# Patient Record
Sex: MALE | Race: WHITE | NOT HISPANIC OR LATINO | Employment: UNEMPLOYED | URBAN - METROPOLITAN AREA
[De-identification: names, ages, dates, MRNs, and addresses within clinical notes are randomized per-mention and may not be internally consistent; named-entity substitution may affect disease eponyms.]

---

## 2017-02-28 ENCOUNTER — HOSPITAL ENCOUNTER (OUTPATIENT)
Dept: BEHAVIORAL HEALTH | Facility: CLINIC | Age: 22
End: 2017-02-28
Attending: SOCIAL WORKER
Payer: COMMERCIAL

## 2017-02-28 ENCOUNTER — BEH TREATMENT PLAN (OUTPATIENT)
Dept: BEHAVIORAL HEALTH | Facility: CLINIC | Age: 22
End: 2017-02-28

## 2017-02-28 ENCOUNTER — HOSPITAL ENCOUNTER (OUTPATIENT)
Dept: BEHAVIORAL HEALTH | Facility: CLINIC | Age: 22
Discharge: HOME | End: 2017-02-28
Attending: SOCIAL WORKER | Admitting: SOCIAL WORKER
Payer: COMMERCIAL

## 2017-02-28 PROCEDURE — 90791 PSYCH DIAGNOSTIC EVALUATION: CPT

## 2017-02-28 RX ORDER — DIVALPROEX SODIUM 500 MG/1
1500 TABLET, EXTENDED RELEASE ORAL
COMMUNITY
Start: 2016-12-06 | End: 2022-09-30

## 2017-02-28 RX ORDER — ARIPIPRAZOLE 5 MG/1
5 TABLET ORAL
COMMUNITY
Start: 2016-12-06 | End: 2022-02-10

## 2017-02-28 RX ORDER — OMEGA-3/DHA/EPA/FISH OIL 60 MG-90MG
1200 CAPSULE ORAL
COMMUNITY
Start: 2014-07-18

## 2017-02-28 ASSESSMENT — ANXIETY QUESTIONNAIRES
GAD7 TOTAL SCORE: 4
4. TROUBLE RELAXING: NOT AT ALL
IF YOU CHECKED OFF ANY PROBLEMS ON THIS QUESTIONNAIRE, HOW DIFFICULT HAVE THESE PROBLEMS MADE IT FOR YOU TO DO YOUR WORK, TAKE CARE OF THINGS AT HOME, OR GET ALONG WITH OTHER PEOPLE: NOT DIFFICULT AT ALL
7. FEELING AFRAID AS IF SOMETHING AWFUL MIGHT HAPPEN: NOT AT ALL
5. BEING SO RESTLESS THAT IT IS HARD TO SIT STILL: NOT AT ALL
1. FEELING NERVOUS, ANXIOUS, OR ON EDGE: SEVERAL DAYS
6. BECOMING EASILY ANNOYED OR IRRITABLE: NOT AT ALL
2. NOT BEING ABLE TO STOP OR CONTROL WORRYING: SEVERAL DAYS
3. WORRYING TOO MUCH ABOUT DIFFERENT THINGS: MORE THAN HALF THE DAYS

## 2017-02-28 ASSESSMENT — PAIN SCALES - GENERAL: PAINLEVEL: MILD PAIN (2)

## 2017-02-28 NOTE — PROGRESS NOTES
Gambling Evaluation   Background Information     Date of Assessment:  2/28/2017     :  HARPREET Love     Referral Source:  Mother   Patient Name:   Farrukh Flynn   YOB: 1995 Age:  21 year old Gender:  male   [facility]  [address] [address]     Home Phone #:     Cell Phone #:  [phone]     Relationship Status  Single, in no serious relationship Ethnicity  White   Client's Primary Language:  English   E-mail address  [email or web address]   Do you give permission to give your cell # to the group?  Yes   Emergency :  Scarlett Jesus   Emergency Contact Phone #:  [phone]     Do you have learning disabilities or require special accommodations?    No     What prompted you to come for a gambling assessment today?     Mother has been calling concerned about patient's gambling and drug use.  She gave him an ultimatim to attend inpatient or outpatient.  He chose to come do this program and in exchange she would be willing to help him with his bills.     Have you been diagnosed with a gambling problem?    No     Have you been diagnosed with alcohol or drug related problems?    No         DIMENSION I - Acute Intoxication /Withdrawal Potential     Gambling History    Stage Age Games Played How Often Average Amt Bet Big Wins/Losses [place]     Early   6 - 16    [employer]  Sports betting   1xweek  1xweek   $20  $5   N/A   Social with friends.      Middle     [age]   Poker  Sports betting  Pull tabs   Monthly  Not sure  2xmo   $50    $40   $200   Borrow money.     Late     [age]   [employer]  Sports betting  On-line   5xweek  1xweek  2xweek   $600/time  $50  $150   $1500, loss in one night   Loss of degree, dropped out of college.  Lost a lot of money.  Overdrafts.  Risk of job loss.  Lying. Sold sunglasses to petersen. Sold video games.     Substance Use History             X = Primary Drug Used   Age of First Use Most Recent Pattern of  Use and Duration   Need enough information to show pattern (both frequency and amounts) and to show tolerance for each chemical that has a diagnosis   Date of last use and time, if needed   Withdrawal Potential? Requiring special care Method of use  (oral, smoked, snort, IV, etc)      Alcohol     15    Patient reports drinking 1-2 beers, 2 nights a week 15        Marijuana/  Hashish   N/A           Cocaine/Crack     N/A           Meth/  Amphetamines   N/A           Heroin     N/A           Other Opiates/  Synthetics   N/A           Inhalants     N/A           Benzodiazepines     N/A           Hallucinogens     N/A           Barbiturates/  Sedatives/  Hypnotics N/A           Over-the-Counter Drugs   N/A           Other     N/A           Nicotine     N/A          Any current physical discomfort or withdrawal concerns?  No    Have you ever been to detox? No    How many times? NA    Have you had any of the following chemical dependency withdrawal symptoms?  Past 12 months Recent (past 30 days)   None None     Have you had any of the following gambling withdrawal symptoms?  Past 12 months Recent (past 30 days)   None None     Dimension I Ratings   Acute intoxication/Withdrawal potential - The placing authority must use the criteria in Dimension I to determine a client s acute intoxication and withdrawal potential.    RISK DESCRIPTIONS - Severity ratin Client displays full functioning with good ability to tolerate and cope with withdrawal discomfort. No signs or symptoms of intoxication or withdrawal or resolving signs or symptoms.    REASONS SEVERITY WAS ASSIGNED (What about the amount of the person s use and date of most recent use and history of withdrawal problems suggests the potential of withdrawal symptoms requiring professional assistance? )     Patient does not have any withdrawal symptoms at this time.       DIMENSION II - Biomedical Complications and Conditions     Do you have any current  health/medical conditions?(Include any infectious diseases, allergies, or chronic or acute pain, history of chronic conditions)       No    List current medication(s) including over-the-counter or herbal supplements--including pain management:     NA    Do you follow current medical recommendations/take medications as prescribed?     NA    Are you up to date on your medical, dental and eye appointments?     Yes    Are you or have you ever been prescribed: Abilify (Aripiprazole), Requip (ropinirole) Zelapar (selegiline hydrochloride), Comtan (entacapone) Mirapex (pramipexole)?     Yes, please explain: Prescribed Abilify 1.5 years ago, will talk to his doctor about changing medication.    Do you have a health care provider?    The patient's PCP is Dr. Lund.    Has a health care provider/healer ever recommended that you reduce or quit alcohol/drug use/gambling?     No    Are you pregnant?     No    Have you had any injuries, assaults/violence towards you, accidents, health related issues, overdose(s) or hospitalizations related to your use of alcohol or other drugs:     No    Do you have any pain control problems?     No    How is your pain managed?     Dealing with pain in his achilles on left side, pain rating 2 of 10.    Do you have any concerns/problems with short or long-term memory?     No    Have you ever neglected your health because of your gambling/alcohol/drug use?     No    Have you ever been admitted to the Emergency Room as a result of your gambling/alcohol/drug use?     No    Dimension II Ratings   Biomedical Conditions and Complications - The placing authority must use the criteria in Dimension II to determine a client s biomedical conditions and complications.   RISK DESCRIPTIONS - Severity ratin Client displays full functioning with good ability to cope with physical discomfort.    REASONS SEVERITY WAS ASSIGNED (What physical/medical problems does this person have that would inhibit his or her  "ability to participate in treatment? What issues does he or she have that require assistance to address?)    Patient does not report any health conditions or medications for biomedical conditions at this time.  Patient is able to seek medical attention as needed.       DIMENSION III - Emotional, Behavioral, Cognitive Conditions and Complications     The patient grew up in:     Grew up in [place].  Parents were , got  when he was 5.  Mom remarried by age 6.  Moved to neighboring community,  [facility], did not have to change schools.  Graduated from [place] [employer].  Went to [employer], lived on campus, for two years.  Took a year off due finances of his and his parents.  Lived with Dad for the year in [place].  Worked at a [employer].  Came back to [place] because he wanted to finish schooling.  Went to [employer], for 1.5 years, dropped out due to gambling.     My childhood could be best described as:     He reports his childhood being \"Fun\"- got to be a kid.  Supported by parents.     Who raised you? (parents, grandparents, adoptive parents, step-parents, etc.)    Mother  Father  Grandparents  Step-father     Growing up, the patient was supported by:     Also felt supported by coaches growing up.     Siblings:     Galo, age 30.  Mathew, age 28.  Patient.      Family CD/Gambling/Mental Health history:     Dad is bi-polar.  Siblings, none.  Mom, none.  Grandfather on mothers side was an alcoholic, got sober, patient has always known him as being sober.  Per mother, she has a schizophrenic brother.       Have you ever been emotionally or verbally abused?            No    Have you ever emotionally or verbally abused someone else?        No    Have you ever been physically abused?            No    Have you ever intentionally hurt yourself by hitting, cutting or burning yourself?            No    Have you ever physically abused someone else?            No    Do you have " any thoughts of harming anyone?            No    Have you ever been sexually abused?            No    Have you ever sexually abused someone else?            No    Has anyone ever complained about your sexual behavior?            Yes, please explain: Masturbation, to excess.  Age 12 - current.  Stopped him from doing other things, during teen years.    Have you ever visited pornographic sites on the internet?            Yes.  How often: frequently 12 - currently.  Do you or anyone else think this ia a problem for you: Yes, please explain: Patient reports it was a problem 19 days ago, he stopped. All free porn. Was getting sores, not able to stop.    Have you ever used food in a way that was harmful to you?            No    Have you ever starved yourself?            No    Have you ever tried to control your weight?            Yes, please explain: Worked out.    Have you ever induced vomiting after eating?            No    Have you ever been diagnosed with a clinical mental health disorder?            Yes, please explain: Was diagnosed bi-polar 1.5 years ago.  He had an incident then, and another one recently.    Have you ever been prescribed any medications for your mental health?            Yes.  When were you prescribed these medications?  1.5 years ago    What medications are you currently taking?            Medications: Depakote and Abilify.  Are you taking these medications as prescribed?  Yes.  How helpful are the medications?  Concerned about Abilify possibly triggering the gambling, will need to talk to doctor about another med.    Are you currently seeing a mental health therapist?            Yes, please explain: Bi-polar 1.5 years ago.      Have you ever had any psychiatric hospitalizations?            Yes, please explain: Brought to the hospital 1.5 years ago, stress from school, relationship factors, money factors.  Family concerned about decisions, he was isolating, and they didn't want him alone.       Have you ever been diagnosed with any learning disabilities?            No    Have you ever been in the ?    No    Highest grade of school completed:     Some college, but no degree    Describe your preferred learning style:      by reading, by hands-on practice and by watching someone else demonstrate    Are you currently ins school?            No    What are your greatest personal strengths?            Patient reports; personality    What do you value most in life?            Family    Dimension III Ratings   Emotional/Behavioral/Cognitive - The placing authority must use the criteria in Dimension III to determine a client s emotional, behavioral, and cognitive conditions and complications.   RISK DESCRIPTIONS - Severity ratin Client has difficulty with impulse control and lacks coping skills. Client has difficulty functioning in significant life areas. Client has moderate symptoms of emotional, behavioral, or cognitive problems. Client is able to participate in most treatment activities.    REASONS SEVERITY WAS ASSIGNED - What current issues might with thinking, feelings or behavior pose barriers to participation in a treatment program? What coping skills or other assets does the person have to offset those issues? Are these problems that can be initially accommodated by a treatment provider? If not, what specialized skills or attributes must a provider have?    The patient has never had treatment for problem gambling.  Patient appears to lack impulse control and coping skills.  The patient reports being diagnosed Bi-polar and takes Depakote and Abilify. Patient s PHQ-9 score was 1 out of 27, indicating minimal depression. Patient s YECENIA-7 score was 4 out of 21, indicating minimal anxiety. Patient denied suicidal and self-injurious ideation and intent at this time. Patient denied suicide attempts in the past. Patient denied a history of trauma and/or abuse. Patient would benefit from following all  "of the recommendations of current mental health providers.  Patient had a difficult time focusing in the assessment.  Writer had to often repeat questions, and while doing paperwork the patient would often be staring at the paper or off to the side.  Patient presented as disengaged and flat.  The description of the \"manic\" episodes suggest other mental health issues or schizoaffective tendencies.  Mother reports that her brother is schizophrenic.  He has had no medication changes, and only missed a few doses almost a month ago.  Patient would benefit from a neuro-psych and possible med changes.         DIMENSION IV - Readiness for Change     How has your gambling affected the relationships with the most important people in your life?     Affected them from taking money from them, lying, and their concerns.    How has your gambling affected other relationships in your life?     Borrowed money.    How has your gambling affected your finances?     Patient has lost a lot of money, he has had to live with his parents and they have had to bail him out.    How has your gambling affected your career?     Dropped out of school, not having a degree.    What values has gambling affected in your life?     Confidence, honor    Has anyone expressed concern about your gambling?     Yes, please explain: Mother, step-dad.    What changes are you willing to make relative to your gambling?     \"To do anything, except inpatient\".    How would you describe your current motivation to stop gambling?     Mainly externally motivated, mother has put pressure on him to attend program.  He reports he wants to live a healthier.      Dimension IV Ratings   Readiness for Change - The placing authority must use the criteria in Dimension IV to determine a client s readiness for change.   RISK DESCRIPTIONS - Severity rating: 3 Client displays inconsistent compliance, minimal awareness of either the client s addiction or mental disorder, and is " minimally cooperative.    REASONS SEVERITY WAS ASSIGNED - (What information did the person provide that supports your assessment of his or her readiness to change? How aware is the person of problems caused by continued use? How willing is she or he to make changes? What does the person feel would be helpful? What has the person been able to do without help?)      The patient did appear to be willing to enter the Intensive Outpatient Problem Gambling Program.  He is both externally and internally motivated.  He was given an ultimatim from his mother in order to get help paying rent and bills.  He agreed to come to the outpatient program but will not consider the inpatient program.  He is aware of how gambling has impacted his life, his finances, and his ability to take care of things in his life.  He also reports a possible porn addiction, or at least a concern about the amount of time he spends masturbating.  He has recently masturbated to the point that he had sores.  Patient seems to be in the preparation/action stage of change.   Patient states that he is willing to enter treatment and make significant life changes.  Patient expressed a desire to abstain from gambling.  Patient appears to lack insight into gambling  and the effects on him.            DIMENSION V - Relapse, Continued Use, and Continued Problem Potential     What triggers increase your likelihood to petersen?    Seeing Cards, having excess money.    What situations increase your likelihood to petersen?    Bordom, and isolation.    How often do you use more alcohol and drugs than you planned?    Mainly drinks one beer, second is optional.    How often do you petersen with more money than you planned?    Everytime    Have you ever tried to control, cut down or quit your gambling addiction?    Yes, please explain: Went to the gym, setting limits, bank limits.    Have you ever tried to control your use of alcohol/drugs?    Yes, please explain: One  beer.    What did you do to stop gambling?    Went to the gym.  Last petersen one month ago, hasn't wanted to petersen this month.    What was your longest period of abstinence from gambling?    Went for two months without gambling recently (two months ago).    What was your longest period of abstinence from alcohol/drugs?    N/A    History of Gambling/CD treatments  Where  (Program) When  (Year) Treatment   (CD/Gamb) Completed  (Yes/No) Length of time GA or CD Free     NA                   If you had prior GA or CD treatment, What was helpful?  What was not helpful?    NA    Please identify which self-help groups you have attended and how often you attended (Gamblers Anonymous, AA, NA, etc.)?    NA    How would you rate your urges to petersen today (0-10, 0 being no urge at all)? 1    How would you rate your average urges for the last 30 days (0-10, 0 being no urge at all)? 6    What has helped you reduce your urges to petersen?    Can use going to the gym to work out, making the choice not to petersen.    What are your biggest relapse triggers?    Cards.      Dimension V Ratings   Relapse/Continued Use/Continued problem potential - The placing authority must use the criteria in Dimension V to determine a client s relapse, continued use, and continued problem potential.   RISK DESCRIPTIONS - Severity rating: 3 Client has poor recognition and understanding of relapse and recidivism issues and displays moderately high vulnerability for further substance use or mental health problems. Client has few coping skills and rarely applies coping skills.    REASONS SEVERITY WAS ASSIGNED - (What information did the person provide that indicates his or her understanding of relapse issues? What about the person s experience indicates how prone he or she is to relapse? What coping skills does the person have that decrease relapse potential?)      Patient has limited understanding of addiction and relapse potential.  Pt has never attended  GA groups. Patient has never been in treatment before and lacks coping skills to prevent relapse.   Patient lacks knowledge of the addiction cycle. He lacks insight into his personal relapse process along with warning signs and triggers. Patient lacks insight into the effects his gambling has had on his physical and mental health. Patient lacks impulse control, gambling free coping skills, and long-term maintenance skills. The patient is at risk for relapsing in gambling behaviors if he does not seek treatment services.  Patient may have undiagnosed  mental health issues, in addition or in lieu of his Bi-Polar, which places him at higher risk for relapse.         DIMENSION VI - Recovery Environment   Are you currently working or in school? Please explain.     The patient is unemployed and looking for work last worked in 19 days ago.     How has gambling affected your work?    Got fired.    How would you describe your current financial status?  In serious debt    Are you are having problems with unpaid bills, bankruptcy, IRS problems, etc.?    Yes, please explain: Unpaid bills:  Credit card, rent, checking accounts    What is your approximate present gambling debt?    $1400 credit cards    Describe a typical week for you i.e. work, leisure activities, socializing, etc.     Lives in [facility], looking for work, works out.    What percentage of time do you petersen alone?  With others?     When he first started gambling he gambled with others, now he generally gambles alone.  His roommate still gambles and has a tendency to drink too much.    Are you currently in a significant relationship?     No    Who do you live with?      Ja, gambles and drink, drinks more than he wants to.  Ja plays solitaire and race.    Do you have any children?      No    Describe your current support system i.e. family, friends, sponsor, therapist, etc.?    family    Do you have any past or present legal charges?    No    Do you  have any obstacles that would prevent you from participating in treatment?    Yes, please explain: Patient seems drugged, either by medication, or undiagnosed mental health issues.  Patient struggled to answer questions and fill out paperwork during the assessment.    Do you pray, meditate, do yoga, attend AA/NA/GA or other spiritual practices?    No    Please list any other problems, issues or concerns that could affect your recovery if not addressed?      NA    Dimension VI Ratings   Recovery environment - The placing authority must use the criteria in Dimension VI to determine a client s recovery environment.   RISK DESCRIPTIONS - Severity rating: 3 Client is not engaged in structured, meaningful activity and the client s peers, family, significant other, and living environment are unsupportive, or there is significant criminal justice system involvement.    REASONS SEVERITY WAS ASSIGNED - (What support does the person have for making changes? What structure/stability does the person have in his or her daily life that will increase the likelihood that changes can be sustained? What problems exist in the person s environment that will jeopardize getting/staying clean and sober?)     The patient does not have adequate support in the community through 12-step meetings or other recovery based interactions. He has never attended GA meetings.  Patient denied being in a significant relationship. Patient has no outside activities, leisure time, friends, social groups or outside interests, except for working out at the gym. Patient lacks a current sober support network familiar with recovery.  Patient lives with a long term friend who continues to petersen and per patient, possibly has a drinking problem.  Patient is currently unemployed. Patient reports no legal concerns.       Collateral contact:  Phone call with mother, Norma Jesus, Release on file.  Patient has had two bi-polar manic episodes.  The first was almost 2  "years ago and he was put on Depakote and Abilify.  He attended an outpatient day treatment to learn about his bi-polar and cope with the symptoms.  In February 2017 he moved into his own apartment and within a week he had missed doses of medication, hadn't unpacked, was isolating, and saying things that didn't make sense.  This is when his mother stepped in to get him some help as she felt the gambling was the biggest issue.  She gave him the choice of inpatient or outpatient gambling treatment in exchange for help with his bills and rent for the next month and a half.  He agreed to come to outpatient.  Norma and her , patients step-father, would prefer that he not move back home with them.          Summary of Gambling Disorder Symptoms     Has made repeated unsuccessful effots to cut down or stop gambling.  Is often preoccupied with gambling (e.g. having persistent thoughts of reliving past gambling experiences, handicapping or planning the next venture, thinking of ways to get money with which to petersen).  Often gambles when feeling distressed (e.g. feelings of helplessness, guilt, anxiety, depression).  After losing money gambling, individual often returned another day to get even. (\"chasing one's losses\")  Lies to conceal the extent of invovlement with gambling.  Has jeopardized or lost a significant relationship, job, educational or career opportunity because of gambling.  Relies on others to provide money to relieve desperate financial situations caused by gambling (\"a bailout\")    Specify if:   Episodic:  Meeting diagnostic at more than one time point, with symptoms subsiding between periods of gambling disorder for at least several months.    Persistent:  Experiencing continuous symptoms, to meet diagnostic criteria for multiple years.    Specify if:   In early remission:  After full criteria for alcohol/drug use disorder were previously met, none of the criteria for alcohol/drug use disorder have " been met for at least 3 months but for less than 12 months (with the exception that Criterion A4,  Craving or a strong desire or urge to use alcohol/drug  may be met).     In sustained remission:   After full criteria for alcohol use disorder were previously met, non of the criteria for alcohol/drug use disorder have been met at any time during a period of 12 months or longer (with the exception that Criterion A4,  Craving or strong desire or urge to use alcohol/drug  may be met).   Specify if:   This additional specifier is used if the individual is in an environment where access to alcohol is restricted.    Mild: Presence of 4-5 symptoms    Moderate: Presence of 6-7 symptoms    Severe: Presence of 8 or more symptoms      Summary of Substance Abuse Disorder Symptoms     A problematic pattern of alcohol/drug use leading to clinically significant impairment or distress, as manifested by at least two of the following, occurring within a 12-month period:    NA    Specify if:   In early remission:  After full criteria for alcohol/drug use disorder were previously met, none of the criteria for alcohol/drug use disorder have been met for at least 3 months but for less than 12 months (with the exception that Criterion A4,  Craving or a strong desire or urge to use alcohol/drug  may be met).     In sustained remission:   After full criteria for alcohol use disorder were previously met, non of the criteria for alcohol/drug use disorder have been met at any time during a period of 12 months or longer (with the exception that Criterion A4,  Craving or strong desire or urge to use alcohol/drug  may be met).   Specify if:   This additional specifier is used if the individual is in an environment where access to alcohol is restricted.    Mild: Presence of 2-3 symptoms    Moderate: Presence of 4-5 symptoms    Severe: Presence of 6 or more symptoms    SOGS: 11 DIGS: 7 CAGE-AID: 0 YECENIA-7: 4 PHQ-9: 1     Mental Status  Assessment    Physical Appearance/Attire:  Appears stated age  Hygiene:  well groomed  Eye Contact:  at examiner  Speech:  regular and slow  Speech Volume:  regular  Speech Quality: fluid  Cognitive/Perceptual:  reality based  Cognition:  memory intact   Judgment:  intact  Insight:  intact  Orientation:  time, place, person and situation  Thought:  logical   Hallucinations:  none  General Behavioral Tone:  cooperative and drowsy  Psychomotor Activity:  no problem noted  Gait:  no problem  Mood:  normal  Affect:  congruence/appropriate and flat/none      Vulnerable Adult Checklist for OUTPATIENTS     1.  Do you have a physical, emotional or mental infirmity or dysfunction?       No    2.  Does this issue impair your ability to provide for your own care without help, including providing yourself with food, shelter, clothing, healthcare or supervision?       No    3.  Because of this issue, I need assistance to protect myself from maltreatment by others.      No    Based on the above information:    This person is not a functional Vulnerable Adult according to [place] Statute 626.5572 subdivision 21.      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 Are you feeling hopeless about the present/future?   No   2. Have you ever had thoughts about taking your life?   No   3. When did you have these thoughts? NA   4. Do you have any current intent or active desire to take your life?   No   5. Do you have a plan to take your life?    No   6. Have you ever made a suicide attempt?   No   7. Do you have access to pills, guns or other methods to kill  "yourself?   No       Risk Status - Use as Guide/Example    Ideation - Active  Thoughts of suicide Intent to follow  Through on suicide Plan for completing  suicide    Yes No Yes No Yes No   Emergent X  X  X    Urgent / Non-Emergent X  X   X   Non-Urgent X   X  X   No Current / Active Risk (Past 6 Months)  X  X  X   Farrukh Flynn No No No       Additional Risk Factors: Significant history of having untreated or poorly treated mental health symptoms  Tendency to be socially isolated and/or cut off from the support of others   Protective Factors:  Having people in his/her life that would prevent the patient from considering committing suicide (i.e. young children, spouse, parents, etc.)  An absence of chronic health problems or stable and well treated chronic health issues  A positive relationship with his/her clinical medical and/or mental health providers  Having easy access to supportive family members  Having a good community support network  Having restricted access to highly lethal means of suicide     Risk Status:    Emergent? No  Urgent / Non-Emergent?  No  Present / Non- Urgent? No      No Current Risk? Yes, Evaluation Counselors - Document in Epic / SBAR to counselor \"No identified risk\" and Treatment Counselors - Assess weekly in progress notes under Dimension 3 and summarize in Discharge / Treatment summary under Dimension 3.    Additional information to support suicide risk rating: See Above    Summary of ASAM Placement Criteria      Intoxication and Withdrawal: 0  Biomedical:  0  Emotional and Behavioral:  2  Readiness to Change:  3  Relapse Potential: 3  Recovery Environmental:  3      Evaluation Summary and Plan   's Recommendation    Abstain from all forms of gambling, including \"free\" games , and online/rashid games.  Refrain from entering all types of mcik establishments.  Self-ban with the help of a trusted other from all local casinos/card rooms, cut up players cards and have all gambling " mail stopped.  Abstain from all mood-altering chemicals unless prescribed by a licensed provider.  Complete the evening outpatient compulsive gambling treatment program at [facility].   Complete Orientation and begin group on 02/28/17.  Attend [place] 12-step, cultural, spiritual, other supportive community meeting on a weekly basis.   Have someone you check in with weekly who will hold you accountable.       Initial problem list:    The patient has unstable housing  The patient is currently living in an unhealthy and/or using environment  The patient lacks relapse prevention skills  The patient has poor coping skills  The patient has poor refusal skills   The patient lacks a sober peer support network  The patient has a tendency to isolate  The patient has dual issues of MI and CD  The patient lacks the ability to effectively manage his/her mental health issues

## 2017-03-01 ENCOUNTER — HOSPITAL ENCOUNTER (OUTPATIENT)
Dept: BEHAVIORAL HEALTH | Facility: CLINIC | Age: 22
End: 2017-03-01
Attending: SOCIAL WORKER
Payer: COMMERCIAL

## 2017-03-01 PROCEDURE — 90853 GROUP PSYCHOTHERAPY: CPT

## 2017-03-01 ASSESSMENT — ANXIETY QUESTIONNAIRES: GAD7 TOTAL SCORE: 4

## 2017-03-01 ASSESSMENT — PATIENT HEALTH QUESTIONNAIRE - PHQ9: SUM OF ALL RESPONSES TO PHQ QUESTIONS 1-9: 1

## 2017-03-01 NOTE — PROGRESS NOTES
Patient went to move his car before coming to group.  Patient never showed up for group.    Heather Hutton MS, LADC, ICGC-II

## 2017-03-01 NOTE — PROGRESS NOTES
D.  Patient attended Problem Gambling IOP orientation.  Video was watched, assignment notebook was given, and orientation forms were signed. A. Pt understood orientation and motivated to begin treatment.  P.  Begin attending group and  complete assignments.    Heather Hutton MS, [facility], ICGC-II

## 2017-03-02 ENCOUNTER — HOSPITAL ENCOUNTER (OUTPATIENT)
Dept: BEHAVIORAL HEALTH | Facility: CLINIC | Age: 22
End: 2017-03-02
Attending: SOCIAL WORKER
Payer: COMMERCIAL

## 2017-03-02 PROCEDURE — 90853 GROUP PSYCHOTHERAPY: CPT

## 2017-03-02 NOTE — PROGRESS NOTES
03/01/2017  5:30-7:30  D) Farrukh attended Family group with his mother. He was fairly quiet but did participate. Discussed the importance of healthy relationships to recovery. I) Facilitate group. A) Farrukh seemed comfortable with the group. Seems to be seeking support. P)  Continue to attend Family Group with his mother.

## 2017-03-03 NOTE — PROGRESS NOTES
"3/2/2017 5:30-7:30pm D. Patient participated his second Intensive Outpatient Problem Gambling group. Patient had attended family group with a few of the group members the night prior. Group participated in a guided meditation.  Introductions and Check-in were done.  Group worked on \"Leisure Chapter 32\" assignment in group with expectations that they will finish the assignment and present in group.  Group watched \"[place]\" a video on the progression and consequences of gambling.    I. Writer facilitated discussion.  A. Pt continues to be flat and delayed, he did not offer input on the assignment or the movie.  P. Pt. Implement information into recovery and begin working on assignments.     Heather Hutton MS, LADC, ICGC-II  "

## 2017-03-07 ENCOUNTER — HOSPITAL ENCOUNTER (OUTPATIENT)
Dept: BEHAVIORAL HEALTH | Facility: CLINIC | Age: 22
End: 2017-03-07
Attending: SOCIAL WORKER
Payer: COMMERCIAL

## 2017-03-07 PROCEDURE — 90853 GROUP PSYCHOTHERAPY: CPT

## 2017-03-08 ENCOUNTER — HOSPITAL ENCOUNTER (OUTPATIENT)
Dept: BEHAVIORAL HEALTH | Facility: CLINIC | Age: 22
End: 2017-03-08
Attending: SOCIAL WORKER
Payer: COMMERCIAL

## 2017-03-08 PROCEDURE — 90853 GROUP PSYCHOTHERAPY: CPT

## 2017-03-08 NOTE — PROGRESS NOTES
3/7/2017 5:30-7:30pm D. Patient participated in the Intensive Outpatient Problem Gambling group. Group participated in a guided meditation. Introductions and Check-in were done, new group member started this evening. Pt shared highs and lows for the week and any struggles with recovery. Patient did not have an assignment to present but listened to patient's that had assignments to present. Writer facilitated discussion. A. Pt was very quite in group and continues to seem very out of it and foggy. P. Pt. Implement information into recovery and begin working on assignments.      Heather Hutton MS, LADC, ICGC-II

## 2017-03-09 ENCOUNTER — HOSPITAL ENCOUNTER (OUTPATIENT)
Dept: BEHAVIORAL HEALTH | Facility: CLINIC | Age: 22
End: 2017-03-09
Attending: SOCIAL WORKER
Payer: COMMERCIAL

## 2017-03-09 PROCEDURE — 90853 GROUP PSYCHOTHERAPY: CPT

## 2017-03-09 NOTE — PROGRESS NOTES
03/08/2017 5:30-7:30  D) Farrukh attended Family Group with his mother. Shared that he has not been doing his assignments or looking for a job. These are requirements for receiving financial support from his parents. He agreed to work on his assignments and report to the group about his job search. Farrukh also has no plan for managing his money once he has a job. He agreed to make a plan. They listened to a talk on the brain. I) Facilitate group. A) Farrukh seems to be seeking support. P) Attend Family Group with mom.

## 2017-03-10 NOTE — PROGRESS NOTES
"3/9/2017 5:30-7:30pm D. Patient participated in the Intensive Outpatient Problem Gambling group. Group participated in a guided meditation.  Introductions and Check-in were done, new group member started this evening.  Pt shared highs and lows for the week and any struggles with recovery. Patient was encouraged to go to the [place] [employer] and ask for help applying for jobs.  He was also encouraged to set a bedtime.  Pt presented the assignment \"Identifying Relapse Triggers and Cues\".  Feedback from the group was that he didn't put enough time into the assignment. Group closed with reading on \"Zeal\".  I. Writer facilitated discussion on \"stinky thinking\" and the basics of Cognitive Behavioral Therapy, how we believe something that was never actually said to us, and how we can retrain our brains to unlearn those beliefs.  A. Pt seemed to be gaining in understanding of how gambling has impacted all areas of life and how triggers come from many aspects of day to day living.  P. Pt. Implement information into recovery and continue working on assignments.     Heather Hutton MS, LADC, ICGC-II  "

## 2017-03-15 ENCOUNTER — HOSPITAL ENCOUNTER (OUTPATIENT)
Dept: BEHAVIORAL HEALTH | Facility: CLINIC | Age: 22
End: 2017-03-15
Attending: SOCIAL WORKER
Payer: COMMERCIAL

## 2017-03-15 PROCEDURE — 90853 GROUP PSYCHOTHERAPY: CPT
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
## 2017-03-21 ENCOUNTER — HOSPITAL ENCOUNTER (OUTPATIENT)
Dept: BEHAVIORAL HEALTH | Facility: CLINIC | Age: 22
End: 2017-03-21
Attending: SOCIAL WORKER
Payer: COMMERCIAL

## 2017-03-21 PROCEDURE — 90853 GROUP PSYCHOTHERAPY: CPT

## 2017-03-22 ENCOUNTER — HOSPITAL ENCOUNTER (OUTPATIENT)
Dept: BEHAVIORAL HEALTH | Facility: CLINIC | Age: 22
End: 2017-03-22
Attending: SOCIAL WORKER
Payer: COMMERCIAL

## 2017-03-22 PROCEDURE — 90853 GROUP PSYCHOTHERAPY: CPT

## 2017-03-22 NOTE — PROGRESS NOTES
"3/21/2017 5:30-7:30pm D. Patient participated in the Intensive Outpatient Problem Gambling group. Patient called to say he was going to be late, he missed meditation and did not hand in a check in sheet.  Introductions and Check-in were done, new group member started this evening.  Pt shared highs and lows for the week and any struggles with recovery.  Patient reported that he has not found a job and is putting in minimal effort.  He was encouraged by a group member to try temp agencies as a way to get into [place] [employer] [place].  Patient listened to group members presenting their assignments. Group closed with reading on \"Fairness\".  I. Writer facilitated discussion.  A. Pt is very quiet in group, it unclear if he is paying attention, he will be asked to give feedback in upcoming groups to encourage engagement. P. Pt. Implement information into recovery and continue working on assignments.     Heather Hutton, MS, [facility], ICGC-II  "

## 2017-03-23 ENCOUNTER — HOSPITAL ENCOUNTER (OUTPATIENT)
Dept: BEHAVIORAL HEALTH | Facility: CLINIC | Age: 22
End: 2017-03-23
Attending: SOCIAL WORKER
Payer: COMMERCIAL

## 2017-03-23 PROCEDURE — 90853 GROUP PSYCHOTHERAPY: CPT

## 2017-03-23 NOTE — PROGRESS NOTES
03/22/2017 5:30-7:30 D) Farrukh attended group with his mother. Shared that he is stressed over his attempt to replace his car. He participated in a communication exercise with his mom and the group. Demonstrated understanding of the skill they practiced. I) Facilitate group. A) Farrukh seems to be seeking support. P) Attend Family Group with mom.

## 2017-03-24 NOTE — PROGRESS NOTES
"3/23/2017 5:30-7:30pm D. Patient participated in the Intensive Outpatient Problem Gambling group. Group participated in a guided meditation.  Introductions and Check-in were done, new group member started this evening.  Pt shared highs and lows for the week and any struggles with recovery.  Patient did report some job hunting. Pt did not have an assignment but listened to a group member present his assignment and other group members give feedback.  Group was to work on \"Patient Safety Plan\" that was handed out, but ran out of time before it was completed.  Group closed with reading on \"Thankfulness\".  I. Writer facilitated discussion.  A. Pt seems to pay little attention in group, and does not offer homework assignments to present. P. Pt. Implement information into recovery and continue working on assignments.     Heather Hutton MS, [facility], ICGC-II  "

## 2017-03-28 ENCOUNTER — HOSPITAL ENCOUNTER (OUTPATIENT)
Dept: BEHAVIORAL HEALTH | Facility: CLINIC | Age: 22
End: 2017-03-28
Attending: SOCIAL WORKER
Payer: COMMERCIAL

## 2017-03-28 PROCEDURE — 90853 GROUP PSYCHOTHERAPY: CPT

## 2017-03-29 ENCOUNTER — HOSPITAL ENCOUNTER (OUTPATIENT)
Dept: BEHAVIORAL HEALTH | Facility: CLINIC | Age: 22
End: 2017-03-29
Attending: SOCIAL WORKER
Payer: COMMERCIAL

## 2017-03-29 PROCEDURE — 90853 GROUP PSYCHOTHERAPY: CPT

## 2017-03-29 NOTE — PROGRESS NOTES
"3/28/2017 5:30-7:30pm D. Patient participated in the Intensive Outpatient Problem Gambling group. Group participated in a guided meditation.  Introductions and Check-in were done, new group member started this evening.  Pt shared highs and lows for the week and any struggles with recovery.  Pt shared that he is working out and car shopping.  It was difficult to get him to report on job hunting as it is not in his radar.  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  He was again encouraged to apply for real jobs, and learn how to take the bus, which he has no desire to do. Tuesday group completed the Weekly Group Inventory asking about gambling and suicidal ideation during the past week.  Patient did not report suicidal ideation or gambling this week.  Patient did report continued excessive masturbation.  Patient did not have an assignment to present.   Group closed with reading on \"Forgiveness\".  I. Writer facilitated discussion.  A. Pt seemed to be struggling with areas in life that could impact long term recovery. P. Pt. Implement information into recovery and continue working on assignments.     Heather Hutton MS, [facility], ICGC-II  "

## 2017-03-30 NOTE — PROGRESS NOTES
"03/29/2017 5:30-7:30  D) Farrukh attended Family Group with his mother. Shared that he found a job. He starts work tomorrow. This relieves a lot of stress. Discussed the topic of \"shame\". Could identify sources of shame in his life. Understands the need for healthy support. I) Facilitate group. A) Farrukh seems to be seeking support. P) Attend Family Group with mother.  "

## 2017-03-31 NOTE — PROGRESS NOTES
Acknowledgement of Current Treatment Plan       I have reviewed my treatment plan with my therapist / counselor on 04/11/17. I agree with the plan as it is written in the electronic health record.    Name Signature   Farrukh Flynn    Name of Therapist / Counselor    HARPREET Love

## 2017-03-31 NOTE — PROGRESS NOTES
[facility]  Adult Gambling Program  Treatment Plan Requirements    These services are provided by the facility for each patient/client according to the individual's treatment plan:    Individual and group counseling    Education    Transition services    Services to address any co-occurring mental illness    Service coordination    Initial Treatment Plan Goals if noted in plan:  1. Complete all the requirements of Program Orientation.  2. Maintain medication compliance throughout the program.  3. Complete gambling therapy for identified issues on your problem list.  4. Gain family involvement in treatment process to address family issues from the problem list.  5. Attend and participate in all required group(s) per individual treatment plan.  6. Focus attention to individualized issues from the treatment plan.  7. Schedule a physical examination if recommended.    In addition to the above, complete all individual goals as specifically outlines on your treatment plan.    Criteria for discharge:  Patients/clients are discharged from the program following completion of the entire program including Phase I and II or acceptance of other post-treatment referrals to mental health providers, or aftercare at other facilities.  Patients/clients may also be discharged for inappropriate behavior, chemical use or gambling.      Favorable Discharge - Patients/clients have completed agreed upon treatment goals, understand their diagnosis and appear motivated about the follow-up care.    Guarded Discharge - Patients/clients have demonstrated some understanding of their diagnosis and recovery process, and have completed some of their treatment goals.  This prognosis also includes patients/clients who have completed some treatment goals but have not made commitment to community support or follow through with referrals.    Unfavorable Discharge - Patients/clients have not completed agreed upon treatment  goals due to their own choice, have limited understanding of their diagnosis, and have shown minimal or inconsistent behavior conducive to recovery.  Those patients/clients discharged due to behavioral problems will also be unfavorable discharges.                                  Adult Gambling Treatment Plan     Name: Farrukh Flynn    MR# [id number]  :  1995    21 year old male    Acute Intoxication/Withdrawal Potential      DIMENSION 1  RISK FACTOR: 0      Assignment Date  Source  Prob/Goal/  Intervention  Target  Date  Initials  Outcome  Completion  Date    3/31/2017 Self - Current, History - Current and Assessment - Current   Problem: Pt did not appear to be under the influence or in withdrawal on the date of assessment.  Goal: Limit, if not abstain, from alcohol, and abstain from all other mood altering chemicals due to history of addiction and/or concern of cross addiction.  Intervention: Report to counselor and group any concern or abuse with alcohol or any drug use during treatment.   Handout:  Rethinking Drinking           3/31/2017     orientation SC       Effective    Effective       17     Biomedical Conditions and Complaints      DIMENSION 2  RISK FACTOR: 0       Assignment Date  Source  Prob/Goal/  Intervention  Target  Date  Initials  Outcome  Completion  Date    3/31/2017 Self - Current, History - Current and Assessment - Current   Problem:  Patient reports no pain or chronic medical conditions.  Goal: Follow recommendations of medical provider.  Intervention: Continue to take prescribed medications and follow-up with medical interventions while in program.      3/31/2017 and ongoing  SC     Effective     17     Emotional/Behavioral/Cognitive Conditions and Complications     DIMENSION 3  RISK FACTOR:  2           Assignment Date Source Prob/Goal/  Intervention Target  Date Initials Outcome Completion  Date   3/31/2017 Self - Current, History - Current and Assessment  - Current   Problem: Problem: Patient's self-esteem has been negatively impacted due to gambling and its consequences.  Goal: To clarify self-concept.  Recognize how this has affected behaviors and how it can affect recovery.  Intervention:  Handout:  Test of Self-Conscious Affect, Version 3 (TOSCA-3S).  Complete and discuss outcome in group.  Handout:  Explore the Real you, Ch. 21,  Who are you Ch. 22             In Group  Week S SC Patient discharged before completing goal    3/31/2017 Self - Current, History - Current and Assessment - Current   Problem: Patient lacks an understanding of gambling as a disease.  Goal:  Gain awareness of gambling as a chronic, progressive, incurable (but treatable) disease.  Intervention:  NAC/Vivitrol/Naltrexone Discussion:  Handout:  Gambling and the Brain  Handout:  Can t stop, won t stop: Feeling impulsive, compulsive, or addicted  Handout:  Treatment of Pathological gambling with naltrexone pharmacotherapy and brief intervention: a  study  Movie:  Disease of compulsive gambling             Intake        In group  In Group SC Patient discharged before completing goal    3/31/2017 Self - Current, History - Current and Assessment - Current   Problem:  Patient has co-occurring issues with problem gambling and mental health.  Patient has past diagnosis of Bi-polar.  Goal:  Stabilize mental health. Develop awareness of how mental health issues affect gambling and vice versa.    Intervention:  Maintain or arrange medication management appointment.  Maintain medication compliance.   Handout:  Coping with Addiction and PTSD or other Anxiety disorders & My Anxiety Profile           By discharge    Week U SC Patient discharged before completing goal    3/31/2017 Self - Current, History - Current and Assessment - Current   Problem:  Pt reports racing thoughts and brain constantly in action.  Goal:  To learn to self soothe and calm the racing thoughts.  Intervention:  Introduction to  Meditation, Mindfulness, and stress reduction through group meditation, and other coping skills shared in group.  Introduction to meditation, and mindfulness    Meditation at the beginning of each group session                 In group SC                 Effective                 05/11/17   3/31/2017 Self - Current, History - Current and Assessment - Current   Problem:  Patient reports history of shame  Goal:  To understand characteristics of shame.   Intervention:  Movie:  Shame and Addiction - Yordan Carmona  Handout:  difference between guilt and shame       In group  In [facility] Patient discharged before completing goal    3/31/2017 Self - Current, History - Current and Assessment - Current   Problem: Patient struggles to have open and clear communication with others.  Goal:  Increase understanding of clear communication styles  Intervention:  Handout:  Communication Roadblocks  Handout:  Communication Styles  Handout:  Assertiveness Training  Group work:  Communication Styles in Action           Week F    In [facility]   Patient discharged before completing goal    3/31/2017 Self - Current, History - Current and Assessment - Current   Problem: Patient struggles with suicidal ideation, past suicide attempts, or is diagnosed with an addiction that has a high rate of suicidal ideation.  Goal:  Increase awareness of signs of growing distress and interventions to decrease suicidal ideation  Complete  Patient Safety Plan Template          Intake or   In Group SC   Patient discharged before completing goal      Readiness to Change     DIMENSION 4  RISK FACTOR: 3             Assignment Date Source Prob/Goal/  Intervention Target  Date Initials Outcome Completion  Date   3/31/2017 Self - Current, History - Current and Assessment - Current   Problem: Problem: Lack of understanding the illness of compulsive gambling and life areas impacted.   Goal: To understand the illness of compulsive gambling and to examine all areas  affected.   Intervention:  Movie:  [place]           In Group SC           Effective           03/02/17   3/31/2017 Self - Current, History - Current and Assessment - Current   Problem: Has a combination of internal and external motivation preceding treatment admission.                       Goal:  Increase internal motivation to remain sober.  Be able to utilize external sources on days when it is harder to stay clean for you  Intervention:  Handout:  Setting and Pursuing Goals in Recovery  Handout:  Chapter 4:  Stages of Change  Handout:  Stages of Change Model  Handout:  Treatment Topic Two:  Strengthening your commitment to change  Ongoing:  Weekly check in sheet               Week M    Week D    Week C  In group SC           Patient discharged before completing goal        Effective                           Discharge   3/31/2017 Self - Current, History - Current and Assessment - Current   Problem:  Patient is disconnected from their Humanity, values, and virtues.  Goal:  Decrease negative self-talk, increase sense of purpose in life and their value to society.  Intervention:  Handout:  Chapter 18:  Values  Handout:  Values: Self Exploration  Handout:  5 values compromised during  active gambling  Handout:  How to speak the language of Sky[place] & [place] list  Group :  Weekly reading on Virtues             Week K  Week N    In Group SC     Patient discharged before completing goal      Effective                   05/11/17     Relapse/Continues Use/Continues Problem Potential     DIMENSION 5  RISK FACTOR: 3               Assignment Date Source Prob/Goal/  Intervention Target  Date Initials Outcome Completion  Date   3/31/2017 Self - Current, History - Current and Assessment - Current   Problem:  Patient has poor relationship with money, large amounts of money gambled, and financial stressors because of gambling.  Goal: Remove all access to money and involvement in financial matters, protect assets.  reduce  financial stress and gain insight into money relationship    Intervention:  Handout:  Chapter 16:  Emotional Meaning of Money  Handout:  Chapter 17:  Money and Finances  Speaker:   Family Means comes to speak     ASAP and ongoing        Week G    In Group SC     Patient discharged before completing goal    3/31/2017 Self - Current, History - Current and Assessment - Current   Problem: Patient lacks insight into their personal relapse process.  Goal:  Gain awareness of gambling as a chronic, progressive, incurable (but treatable) disease.  Intervention:  Handout:  Relapse Prevention Planning  Handout:  Personal Recovery Planning  Handout:  Taking Daily Inventory           Week B  Week Q   SC   Patient discharged before completing goal    3/31/2017 Self - Current, History - Current and Assessment - Current   Problem:  Patient lacks an understanding of relapse triggers and cues.  Goal:  Gain an understanding of events, places, and other variables can trigger a relapse.  Intervention:  Handout:  Trigger Inventory  Handout:  Personal Recovery Planning  Handout:  Identifying relapse triggers and cues           Week O    Week F SC           Effective    Effective           3/09/17    03/09/17     3/31/2017 Self - Current, History - Current and Assessment - Current   Problem:  Patient has a history of errors in thinking, using justification, rationalization, and other irrational thinking to justify gambling.  Goal:   Increase awareness of thinking errors, strengthen rational thought process.  Intervention:  Handout:  The cognitive model  Handout:  The Ten Forms of Twisted Thinking  Handout:  Correcting Disordered Thinking             In Group SC   Patient discharged before completing goal        Recovery Environment     DIMENSION 6  RISK FACTOR: 3                 Assignment Date Source Prob/Goal/  Intervention Target  Date Initials Outcome Completion  Date     3/31/2017 Self - Current, History - Current and Assessment -  Current   Problem: Patient lacks a sober support system.  Goal: Develop a strong network of people in recovery, introduction to Gamblers Anonymous, & learn alternatives to GA.  Intervention:  Group work:  Alternatives to the 12 steps  Journal:  Identify GA/support meetings you can attend  Speaker:  GA Member  Handout:  GA book  Movie:  The Anonymous People           In Group    In Group  Orientation  In Group SC               Effective  Effective                 03/14/17  02/28/17     3/31/2017 Self - Current, History - Current and Assessment - Current   Problem: Patient is currently unemployed.  Goal: Seek employment conducive to recovery.  Intervention:  Identify 10 places of employment you are willing to apply for a job and include in your aftercare plan.  Continue active job hunting throughout treatment and report status to group and counselor.     Individual SC     Effective     05/11/17   3/31/2017 Self - Current, History - Current and Assessment - Current   Problem: Relationships with family/concerned persons have been strained through use and related behaviors  Goal:  Begin healing damaged relationships.  Allow adequate time for healing to occur  Intervention:  Handout:  Treatment Topic Five:  Healing Relationships  Handout:  Family Ch. 24, Relationships Ch. 25  Family Group           Week I  Week R  Phase I SC   Patient discharged before completing goal    Effective               05/11/17   3/31/2017 Self - Current, History - Current and Assessment - Current   Problem:  Patient struggles with finding balance in life and handling daily stress.  Goal:  Discover a proper balance between self and life responsibilities  Intervention:  Handout:  Chapter 32/33:  Leisure and Balance  Handout:  Life Balance Wheel           Week F SC           Effective           03/02/17     3/31/2017 Self - Current, History - Current and Assessment - Current   Problem: Patient has suffered the effects of problem gambling  Goal:  Complete Treatment  Intervention:   Handout:  Complete  Gambling Treatment Admission Questionnaire.  Gambling Treatment  Discharge Questionnaire  Handout:  Planning Aftercare  Continued Care for Long Term Recovery       Intake    Discharge    Discharge SC     Patient discharged before completing goal        All interventions that are designated as  current  will need to be completed in order to transition out of treatment with a favorable prognosis. The treatment plan is a flexible document and a work in progress. Interventions and goals may be added at any time to customize plan to each individual s needs. Client may work with therapist to change interventions as long as they pertain to the goals stipulated in the plan and/or are clinically driven.

## 2017-03-31 NOTE — PROGRESS NOTES
"04/11/17  @ 7:30 pm : D) Pt met with Problem Gambling counseling staff to review treatment plan.   Client Treatment goal list:  1. 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 \"Refrain from masturbation for the next 39 days\".  3. \"Refrain from gambling for the entire program I'm here.\"  A) Pt signed treatment plan and appeared motivated to begin working on problems identified and discussed.   P) Pt to continue in group and working on assignments.  Heather Hutton MS, LADC. [facility]  "

## 2017-04-05 ENCOUNTER — HOSPITAL ENCOUNTER (OUTPATIENT)
Dept: BEHAVIORAL HEALTH | Facility: CLINIC | Age: 22
End: 2017-04-05
Attending: SOCIAL WORKER
Payer: COMMERCIAL

## 2017-04-05 PROCEDURE — 90853 GROUP PSYCHOTHERAPY: CPT

## 2017-04-06 NOTE — PROGRESS NOTES
04/05/2017 5:30-7:30 D) Farrukh attended group with his mother. Still is not working. Discussed the need for healthy relationships. Has had urges to petersen but has not.  He has not reached out to his treatment peers. Agreed to in the future.I) Facilitate group. A) Farrukh seems to be seeking support. P) Attend Family Group with mother.

## 2017-04-11 ENCOUNTER — HOSPITAL ENCOUNTER (OUTPATIENT)
Dept: BEHAVIORAL HEALTH | Facility: CLINIC | Age: 22
End: 2017-04-11
Attending: SOCIAL WORKER
Payer: COMMERCIAL

## 2017-04-11 PROCEDURE — 90853 GROUP PSYCHOTHERAPY: CPT

## 2017-04-11 PROCEDURE — 90832 PSYTX W PT 30 MINUTES: CPT

## 2017-04-12 NOTE — PROGRESS NOTES
4/11/2017 5:30-7:30pm D. Patient participated in the Intensive Outpatient Problem Gambling group. Group started with a member of Gamblers Anonymous speaking and sharing his story of recovery.  Introductions and Check-in were done, group discussions were held on some of the shares.  Pt shared highs and lows for the week and any struggles with recovery.  Pt shared that he is hired as a  in a restaurant.  Handling of tips was discussed. Discussion on addictiveness of phones and video games, patient reported limiting his video game time. Tuesday group completed the Weekly Group Inventory asking about gambling and suicidal ideation during the past week.  Patient did not report suicidal ideation or gambling this week.  Patient did report getting a job and a car.  I. Writer facilitated discussion.  A. Pt seemed interested in the GA speaker. P. Pt. Implement information into recovery and continue working on assignments.     Heather Hutton MS, [facility], [facility]

## 2017-04-13 ENCOUNTER — HOSPITAL ENCOUNTER (OUTPATIENT)
Dept: BEHAVIORAL HEALTH | Facility: CLINIC | Age: 22
End: 2017-04-13
Attending: SOCIAL WORKER
Payer: COMMERCIAL

## 2017-04-13 PROCEDURE — 90853 GROUP PSYCHOTHERAPY: CPT

## 2017-04-14 NOTE — PROGRESS NOTES
4/13/2017 5:30-7:30pm D. Patient participated in the Intensive Outpatient Problem Gambling group. Group participated in a guided meditation.  Introductions and Check-in were done.  Pt shared highs and lows for the week and any struggles with recovery.  Pt shared  About his new job.   Patient did not have an assignment to present, but listened to other group members who presented assignments.   I. Writer facilitated discussion & assignments were handed out to the group. Discussion was held on being focused on money and debt after some group members shared the amount of time spent on spreadsheets looking at their finances.  A. Pt seemed to be engaged in the group process. P. Pt. Implement information into recovery and continue working on assignments.     Heather Hutton MS, LADC, [facility]

## 2017-04-19 ENCOUNTER — HOSPITAL ENCOUNTER (OUTPATIENT)
Dept: BEHAVIORAL HEALTH | Facility: CLINIC | Age: 22
End: 2017-04-19
Attending: SOCIAL WORKER
Payer: COMMERCIAL

## 2017-04-19 PROCEDURE — 90853 GROUP PSYCHOTHERAPY: CPT

## 2017-04-20 NOTE — PROGRESS NOTES
04/19/2017  5:30-7:30  D) Farrukh attended group with his mother. He has not started work yet. Has no money. Shared that he needs to pass a test before he can start work.  Is anxious. Also having car trouble. Adds to his stress. Has not had urges to petersen. Group talked about the signs of addiction. I) Facilitate group. A) Farrukh needs to establish a support network. P) Continue to attend Family Group with mother.

## 2017-05-03 ENCOUNTER — HOSPITAL ENCOUNTER (OUTPATIENT)
Dept: BEHAVIORAL HEALTH | Facility: CLINIC | Age: 22
End: 2017-05-03
Attending: SOCIAL WORKER
Payer: COMMERCIAL

## 2017-05-03 PROCEDURE — 90853 GROUP PSYCHOTHERAPY: CPT

## 2017-05-04 NOTE — PROGRESS NOTES
05/03/2017  5:30- 7:30  D) Farrukh attended group with his mother. He shared that he has missed group because of his new job. He will try to attend regularly.His mother defends him. They learned some information about the brain. I) Facilitate group. A) Farrukh ridley not seem committed to the program. P) Attend Family Program with mother.

## 2017-05-10 ENCOUNTER — HOSPITAL ENCOUNTER (OUTPATIENT)
Dept: BEHAVIORAL HEALTH | Facility: CLINIC | Age: 22
End: 2017-05-10
Attending: SOCIAL WORKER
Payer: COMMERCIAL

## 2017-05-10 PROCEDURE — 90853 GROUP PSYCHOTHERAPY: CPT

## 2017-05-11 NOTE — PROGRESS NOTES
05/11/2017  5:30-7:30  D) Farrukh attended group with his father. Shared that things are not going well at work. Reports that his dad knows all about his gambling. They have nothing to share. Farrukh was extremely quiet. Did not seem engaged at all. I) Facilitate group. A) Farrukh does not seem to be working toward gaining support. P) Talk to counselor about his future in the program.

## 2022-02-10 DIAGNOSIS — F31.81 BIPOLAR 2 DISORDER (H): Primary | ICD-10-CM

## 2022-02-10 NOTE — TELEPHONE ENCOUNTER
Pt calling and has an appt scheduled with Dr Gonzalez on 2/16 (new pt) his former provider left the practice. He is requesting a small refill on depakote and abilify to carry him over until his appt with Dr Gonzalez. He is out of medication. Any questions, pt can be reached at [phone]  Martina Buchanan,

## 2022-02-10 NOTE — TELEPHONE ENCOUNTER
I want to know when the last dose was, and the dose carleen'd up are current.   Plus, I can extend refill depakote part, but these are related with bipolar disorder, and especially for Abilify, I don't prescribe due to insufficient experience with the med. The patient should be referred to mental health ASAP for the reason. Please let him know      thx

## 2022-02-15 RX ORDER — ARIPIPRAZOLE 5 MG/1
5 TABLET ORAL DAILY
Qty: 30 TABLET | Refills: 3 | Status: SHIPPED | OUTPATIENT
Start: 2022-02-15 | End: 2022-06-14

## 2022-02-15 RX ORDER — DIVALPROEX SODIUM 500 MG/1
1000 TABLET, EXTENDED RELEASE ORAL AT BEDTIME
Qty: 90 TABLET | Refills: 1 | Status: SHIPPED | OUTPATIENT
Start: 2022-02-15 | End: 2022-05-26

## 2022-02-15 NOTE — TELEPHONE ENCOUNTER
Pt returned call to notify the last time he has taken these medications:    Abilify: last night 2/14  Depakote: 2 days ago 2/13     Relayed Dr. Gonzalez's message, see below. He stated his understanding, routing to provider to notify.     Call back: [phone]  OK to leave a detailed vm.     Annabelle TOLBERT RN  [facility]

## 2022-02-15 NOTE — TELEPHONE ENCOUNTER
Patient Contact    Attempt # 2    Was call answered?  No.  Left message on voicemail with information to call triage back.    On call back:   See note below from Dr. Gonzalez. Advised patient to call back ASAP.  Mary Valdivia RN

## 2022-02-16 ENCOUNTER — OFFICE VISIT (OUTPATIENT)
Dept: FAMILY MEDICINE | Facility: CLINIC | Age: 27
End: 2022-02-16
Payer: COMMERCIAL

## 2022-02-16 VITALS
RESPIRATION RATE: 14 BRPM | HEIGHT: 73 IN | SYSTOLIC BLOOD PRESSURE: 132 MMHG | WEIGHT: 223 LBS | TEMPERATURE: 97.6 F | OXYGEN SATURATION: 98 % | HEART RATE: 54 BPM | DIASTOLIC BLOOD PRESSURE: 82 MMHG | BODY MASS INDEX: 29.55 KG/M2

## 2022-02-16 DIAGNOSIS — F31.62 BIPOLAR DISORDER, CURRENT EPISODE MIXED, MODERATE (H): Primary | ICD-10-CM

## 2022-02-16 LAB
ERYTHROCYTE [DISTWIDTH] IN BLOOD BY AUTOMATED COUNT: 12.6 % (ref 10–15)
HCT VFR BLD AUTO: 46.9 % (ref 40–53)
HGB BLD-MCNC: 15.9 G/DL (ref 13.3–17.7)
MCH RBC QN AUTO: 30.6 PG (ref 26.5–33)
MCHC RBC AUTO-ENTMCNC: 33.9 G/DL (ref 31.5–36.5)
MCV RBC AUTO: 90 FL (ref 78–100)
PLATELET # BLD AUTO: 214 10E3/UL (ref 150–450)
RBC # BLD AUTO: 5.2 10E6/UL (ref 4.4–5.9)
VALPROATE SERPL-MCNC: 6 MG/L
WBC # BLD AUTO: 4.7 10E3/UL (ref 4–11)

## 2022-02-16 PROCEDURE — 80053 COMPREHEN METABOLIC PANEL: CPT | Performed by: FAMILY MEDICINE

## 2022-02-16 PROCEDURE — 99204 OFFICE O/P NEW MOD 45 MIN: CPT | Performed by: FAMILY MEDICINE

## 2022-02-16 PROCEDURE — 85027 COMPLETE CBC AUTOMATED: CPT | Performed by: FAMILY MEDICINE

## 2022-02-16 PROCEDURE — 83721 ASSAY OF BLOOD LIPOPROTEIN: CPT | Performed by: FAMILY MEDICINE

## 2022-02-16 PROCEDURE — 80164 ASSAY DIPROPYLACETIC ACD TOT: CPT | Performed by: FAMILY MEDICINE

## 2022-02-16 PROCEDURE — 84443 ASSAY THYROID STIM HORMONE: CPT | Performed by: FAMILY MEDICINE

## 2022-02-16 PROCEDURE — 36415 COLL VENOUS BLD VENIPUNCTURE: CPT | Performed by: FAMILY MEDICINE

## 2022-02-16 ASSESSMENT — PAIN SCALES - GENERAL: PAINLEVEL: NO PAIN (0)

## 2022-02-16 NOTE — PROGRESS NOTES
"  Assessment & Plan     Bipolar disorder, current episode mixed, moderate (H)  Has been diagnosed 12 years ago and controled well with current dose of med for last 4-5 years, has no side effect from the meds,   Will have him to keep monitoring and recheck in 6 months for f/u  Will review the lab results and update pt    - Valproic acid; Future  - LDL cholesterol direct; Future  - CBC with platelets; Future  - Comprehensive metabolic panel (BMP + Alb, Alk Phos, ALT, AST, Total. Bili, TP); Future  - TSH with free T4 reflex; Future           BMI:   Estimated body mass index is 29.42 kg/m  as calculated from the following:    Height as of this encounter: 1.854 m (6' 1\").    Weight as of this encounter: 101.2 kg (223 lb).   Weight management plan: Discussed healthy diet and exercise guidelines    FUTURE APPOINTMENTS:       - Follow-up visit in 6 months for CPE    No follow-ups on file.    Andrew Gonzalez MD  [facility] MIKE Chadwick is a 26 year old who presents for the following health issues     History of Present Illness     Reason for visit:  Bipolar  Symptom onset:  More than a month  Symptoms include:  NA  Symptom intensity:  Mild  Symptom progression:  Staying the same  Had these symptoms before:  Yes  Has tried/received treatment for these symptoms:  Yes  Previous treatment was successful:  Yes  Prior treatment description:  Medications  What makes it worse:  Unmedicated  What makes it better:  Niki consumes 3 sweetened beverage(s) daily.He exercises with enough effort to increase his heart rate 30 to 60 minutes per day.  He exercises with enough effort to increase his heart rate 6 days per week. He is missing 1 dose(s) of medications per week.       Review of Systems   Constitutional, HEENT, cardiovascular, pulmonary, gi and gu systems are negative, except as otherwise noted.      Objective    /82   Pulse 54   Temp 97.6  F (36.4  C) (Tympanic)   Resp 14   Ht 1.854 m (6' " "1\")   Wt 101.2 kg (223 lb)   SpO2 98%   BMI 29.42 kg/m    Body mass index is 29.42 kg/m .  Physical Exam   GENERAL: healthy, alert and no distress  NECK: no adenopathy, no asymmetry, masses, or scars and thyroid normal to palpation  RESP: lungs clear to auscultation - no rales, rhonchi or wheezes  CV: regular rate and rhythm, normal S1 S2, no S3 or S4, no murmur, click or rub, no peripheral edema and peripheral pulses strong  ABDOMEN: soft, nontender, no hepatosplenomegaly, no masses and bowel sounds normal  MS: no gross musculoskeletal defects noted, no edema                "

## 2022-02-16 NOTE — LETTER
February 17, 2022      Farrukh Flynn  [address] [facility] [address]        Dear ,    We are writing to inform you of your test results.    You maybe able to check the lab results via [employer], it showed your lab results including LDL cholesterol and thyroid function/complete blood cell counts/electrolyte balance and glucose/liver and kidney function/valproic acid level were all normal.   Please stay on top of your current health status and recheck fasting lab at your next physical exam.       Resulted Orders   Valproic acid   Result Value Ref Range    Valproic acid 6   mg/L      Comment:      Therapeutic Range:  mg/L   Epilepsy and dagoberto patients:  mg/L  Some patients require and can tolerate values up to 150 mg/L    Critical:  Greater than 150 mg/L   LDL cholesterol direct   Result Value Ref Range    LDL Cholesterol Direct 84 <100 mg/dL      Comment:      Age 0-19 years:  Desirable: 0-110 mg/dL   Borderline high: 110-129 mg/dL   High: >= 130 mg/dL    Age 20 years and older:  Desirable: <100mg/dL  Above desirable: 100-129 mg/dL   Borderline high: 130-159 mg/dL   High: 160-189 mg/dL   Very high: >= 190 mg/dL   CBC with platelets   Result Value Ref Range    WBC Count 4.7 4.0 - 11.0 10e3/uL    RBC Count 5.20 4.40 - 5.90 10e6/uL    Hemoglobin 15.9 13.3 - 17.7 g/dL    Hematocrit 46.9 40.0 - 53.0 %    MCV 90 78 - 100 fL    MCH 30.6 26.5 - 33.0 pg    MCHC 33.9 31.5 - 36.5 g/dL    RDW 12.6 10.0 - 15.0 %    Platelet Count 214 150 - 450 10e3/uL   Comprehensive metabolic panel (BMP + Alb, Alk Phos, ALT, AST, Total. Bili, TP)   Result Value Ref Range    Sodium 136 133 - 144 mmol/L    Potassium 3.9 3.4 - 5.3 mmol/L    Chloride 102 94 - 109 mmol/L    Carbon Dioxide (CO2) 26 20 - 32 mmol/L    Anion Gap 8 3 - 14 mmol/L    Urea Nitrogen 21 7 - 30 mg/dL    Creatinine 0.76 0.66 - 1.25 mg/dL    Calcium 9.2 8.5 - 10.1 mg/dL    Glucose 81 70 - 99 mg/dL    Alkaline Phosphatase 37 (L) 40 - 150 U/L     AST 23 0 - 45 U/L    ALT 30 0 - 70 U/L    Protein Total 8.1 6.8 - 8.8 g/dL    Albumin 4.2 3.4 - 5.0 g/dL    Bilirubin Total 0.6 0.2 - 1.3 mg/dL    GFR Estimate >90 >60 mL/min/1.73m2      Comment:      Effective December 21, 2021 eGFRcr in adults is calculated using the 2021 CKD-EPI creatinine equation which includes age and gender (Ollie et al., NEJM, DOI: 10.1056/AVTKcw7097775)   TSH with free T4 reflex   Result Value Ref Range    TSH 1.10 0.40 - 4.00 mU/L       If you have any questions or concerns, please call the clinic at the number listed above.       Sincerely,      Andrew Gonzalez MD

## 2022-02-17 LAB
ALBUMIN SERPL-MCNC: 4.2 G/DL (ref 3.4–5)
ALP SERPL-CCNC: 37 U/L (ref 40–150)
ALT SERPL W P-5'-P-CCNC: 30 U/L (ref 0–70)
ANION GAP SERPL CALCULATED.3IONS-SCNC: 8 MMOL/L (ref 3–14)
AST SERPL W P-5'-P-CCNC: 23 U/L (ref 0–45)
BILIRUB SERPL-MCNC: 0.6 MG/DL (ref 0.2–1.3)
BUN SERPL-MCNC: 21 MG/DL (ref 7–30)
CALCIUM SERPL-MCNC: 9.2 MG/DL (ref 8.5–10.1)
CHLORIDE BLD-SCNC: 102 MMOL/L (ref 94–109)
CO2 SERPL-SCNC: 26 MMOL/L (ref 20–32)
CREAT SERPL-MCNC: 0.76 MG/DL (ref 0.66–1.25)
GFR SERPL CREATININE-BSD FRML MDRD: >90 ML/MIN/1.73M2
GLUCOSE BLD-MCNC: 81 MG/DL (ref 70–99)
LDLC SERPL CALC-MCNC: 84 MG/DL
POTASSIUM BLD-SCNC: 3.9 MMOL/L (ref 3.4–5.3)
PROT SERPL-MCNC: 8.1 G/DL (ref 6.8–8.8)
SODIUM SERPL-SCNC: 136 MMOL/L (ref 133–144)
TSH SERPL DL<=0.005 MIU/L-ACNC: 1.1 MU/L (ref 0.4–4)

## 2022-05-14 ENCOUNTER — HEALTH MAINTENANCE LETTER (OUTPATIENT)
Age: 27
End: 2022-05-14

## 2022-05-24 DIAGNOSIS — F31.81 BIPOLAR 2 DISORDER (H): ICD-10-CM

## 2022-05-24 RX ORDER — DIVALPROEX SODIUM 500 MG/1
1500 TABLET, EXTENDED RELEASE ORAL
Status: CANCELLED | OUTPATIENT
Start: 2022-05-24

## 2022-05-26 RX ORDER — DIVALPROEX SODIUM 500 MG/1
1000 TABLET, EXTENDED RELEASE ORAL AT BEDTIME
Qty: 180 TABLET | Refills: 0 | Status: SHIPPED | OUTPATIENT
Start: 2022-05-26 | End: 2022-10-25

## 2022-05-26 NOTE — TELEPHONE ENCOUNTER
Routing refill request to provider for review/approval because:      Refill requested again on 5/24/2022 - this is message from 5/23/2022    Attempt # 3  Was call answered?    No  Left non-detailed message to call the clinic back at [phone]. Routing to provider to notify of 3 attempts to contact.       Florina Bella RN  [facility]

## 2022-06-13 DIAGNOSIS — F31.81 BIPOLAR 2 DISORDER (H): ICD-10-CM

## 2022-06-14 RX ORDER — ARIPIPRAZOLE 10 MG/1
TABLET ORAL
Qty: 15 TABLET | Refills: 1 | Status: SHIPPED | OUTPATIENT
Start: 2022-06-14 | End: 2022-08-18

## 2022-06-14 NOTE — TELEPHONE ENCOUNTER
Prescription approved per [facility] Refill Protocol.    Janelle CONNORS RN  [facility] [place]  [phone]

## 2022-07-09 ENCOUNTER — HEALTH MAINTENANCE LETTER (OUTPATIENT)
Age: 27
End: 2022-07-09

## 2022-09-03 ENCOUNTER — HEALTH MAINTENANCE LETTER (OUTPATIENT)
Age: 27
End: 2022-09-03

## 2022-09-17 DIAGNOSIS — F31.81 BIPOLAR 2 DISORDER (H): ICD-10-CM

## 2022-09-21 RX ORDER — ARIPIPRAZOLE 10 MG/1
TABLET ORAL
Qty: 15 TABLET | Refills: 0 | OUTPATIENT
Start: 2022-09-21

## 2022-09-21 NOTE — TELEPHONE ENCOUNTER
Routing refill request to provider for review/approval because:  Lipid panel     Jere Ashton RN  [facility] Triage Nurse

## 2022-09-22 DIAGNOSIS — F31.81 BIPOLAR 2 DISORDER (H): ICD-10-CM

## 2022-09-24 DIAGNOSIS — F31.81 BIPOLAR 2 DISORDER (H): ICD-10-CM

## 2022-09-24 NOTE — TELEPHONE ENCOUNTER
Reason for Call:  Medication or medication refill:    Do you use a [facility]?  Name of the pharmacy and phone number for the current request:  [facility] in [place]  Name of the medication requested: abilify    Other request: Patient has appt set up but will run out of medication before then.    Can we leave a detailed message on this number? YES    Phone number patient can be reached at: Cell number on file:    Telephone Information:   Mobile [phone]       Best Time: anytime    Call taken on 9/24/2022 at 11:55 AM by ASHLEY MALIK

## 2022-09-26 RX ORDER — ARIPIPRAZOLE 10 MG/1
TABLET ORAL
Qty: 15 TABLET | Refills: 0 | Status: SHIPPED | OUTPATIENT
Start: 2022-09-26 | End: 2022-09-30

## 2022-09-26 RX ORDER — ARIPIPRAZOLE 10 MG/1
TABLET ORAL
Qty: 15 TABLET | Refills: 0 | OUTPATIENT
Start: 2022-09-26

## 2022-09-26 NOTE — TELEPHONE ENCOUNTER
Routing refill request to provider for review/approval because:  Labs not current:  Lipid Panel.   Please see note below, upcoming appointment but will run out.   Annabelle TOLBERT RN  [facility]

## 2022-09-30 ENCOUNTER — VIRTUAL VISIT (OUTPATIENT)
Dept: FAMILY MEDICINE | Facility: CLINIC | Age: 27
End: 2022-09-30
Payer: COMMERCIAL

## 2022-09-30 DIAGNOSIS — F31.81 BIPOLAR 2 DISORDER (H): ICD-10-CM

## 2022-09-30 PROCEDURE — 99213 OFFICE O/P EST LOW 20 MIN: CPT | Mod: 95 | Performed by: FAMILY MEDICINE

## 2022-09-30 RX ORDER — ARIPIPRAZOLE 5 MG/1
5 TABLET ORAL DAILY
Qty: 90 TABLET | Refills: 1 | Status: SHIPPED | OUTPATIENT
Start: 2022-09-30

## 2022-09-30 NOTE — PROGRESS NOTES
"Farrukh is a 27 year old who is being evaluated via a billable video visit.      How would you like to obtain your AVS? MyChart  If the video visit is dropped, the invitation should be resent by: Text to cell phone: [phone]  Will anyone else be joining your video visit? No          Assessment & Plan     Bipolar 2 disorder (H)  Has been stable with current dose of medicine, will keep him on the current dose on both of Depakote and Abilify   Encouraged him to RTC in 5-6 months for CPE  - ARIPiprazole (ABILIFY) 5 MG tablet; Take 1 tablet (5 mg) by mouth daily         BMI:   Estimated body mass index is 29.42 kg/m  as calculated from the following:    Height as of 2/16/22: 1.854 m (6' 1\").    Weight as of 2/16/22: 101.2 kg (223 lb).       FUTURE APPOINTMENTS:       - Follow-up visit in 5-6 months for CPE    No follow-ups on file.    Andrew Gonzalez MD  [facility]    Allyssa Chadwick is a 27 year old presenting for the following health issues:  Recheck Medication      HPI     Medication Followup of Abilify and Depakote    Taking Medication as prescribed: yes    Side Effects:  None    Medication Helping Symptoms:  yes        Review of Systems   Constitutional, HEENT, cardiovascular, pulmonary, gi and gu systems are negative, except as otherwise noted.      Objective           Vitals:  No vitals were obtained today due to virtual visit.    Physical Exam   GENERAL: Healthy, alert and no distress  EYES: Eyes grossly normal to inspection.  No discharge or erythema, or obvious scleral/conjunctival abnormalities.  RESP: No audible wheeze, cough, or visible cyanosis.  No visible retractions or increased work of breathing.    SKIN: Visible skin clear. No significant rash, abnormal pigmentation or lesions.  NEURO: Cranial nerves grossly intact.  Mentation and speech appropriate for age.  PSYCH: Mentation appears normal, affect normal/bright, judgement and insight intact, normal speech and appearance " well-groomed.                Video-Visit Details    Video Start Time: 7:49    Type of service:  Video Visit    Video End Time:8:09 AM    Originating Location (pt. Location): Home    Distant Location (provider location):  [facility]     Platform used for Video Visit: [employer]

## 2022-10-20 DIAGNOSIS — F31.81 BIPOLAR 2 DISORDER (H): ICD-10-CM

## 2022-10-25 RX ORDER — DIVALPROEX SODIUM 500 MG/1
1000 TABLET, EXTENDED RELEASE ORAL AT BEDTIME
Qty: 180 TABLET | Refills: 1 | Status: SHIPPED | OUTPATIENT
Start: 2022-10-25 | End: 2022-12-10

## 2022-10-25 NOTE — TELEPHONE ENCOUNTER
Routing refill request to provider for review/approval because:   Review Authorizing provider's last note.     Depakote level within therapeutic range in past 26 months     Roxana Balderrama RN

## 2022-12-08 DIAGNOSIS — F31.81 BIPOLAR 2 DISORDER (H): ICD-10-CM

## 2022-12-10 RX ORDER — DIVALPROEX SODIUM 500 MG/1
1000 TABLET, EXTENDED RELEASE ORAL AT BEDTIME
Qty: 180 TABLET | Refills: 1 | Status: SHIPPED | OUTPATIENT
Start: 2022-12-10

## 2023-06-03 ENCOUNTER — HEALTH MAINTENANCE LETTER (OUTPATIENT)
Age: 28
End: 2023-06-03

## 2024-07-06 ENCOUNTER — HEALTH MAINTENANCE LETTER (OUTPATIENT)
Age: 29
End: 2024-07-06

## 2025-07-13 ENCOUNTER — HEALTH MAINTENANCE LETTER (OUTPATIENT)
Age: 30
End: 2025-07-13